# Patient Record
Sex: FEMALE | Race: WHITE
[De-identification: names, ages, dates, MRNs, and addresses within clinical notes are randomized per-mention and may not be internally consistent; named-entity substitution may affect disease eponyms.]

---

## 2019-04-07 ENCOUNTER — HOSPITAL ENCOUNTER (OUTPATIENT)
Dept: HOSPITAL 31 - C.ER | Age: 44
Setting detail: OBSERVATION
LOS: 1 days | Discharge: LEFT BEFORE DISCHARGE | End: 2019-04-08
Attending: INTERNAL MEDICINE | Admitting: INTERNAL MEDICINE
Payer: MEDICAID

## 2019-04-07 DIAGNOSIS — Z88.6: ICD-10-CM

## 2019-04-07 DIAGNOSIS — Z91.19: ICD-10-CM

## 2019-04-07 DIAGNOSIS — J45.909: ICD-10-CM

## 2019-04-07 DIAGNOSIS — F17.210: ICD-10-CM

## 2019-04-07 DIAGNOSIS — I10: ICD-10-CM

## 2019-04-07 DIAGNOSIS — E11.9: ICD-10-CM

## 2019-04-07 DIAGNOSIS — F10.239: ICD-10-CM

## 2019-04-07 DIAGNOSIS — Z91.14: ICD-10-CM

## 2019-04-07 DIAGNOSIS — E87.6: Primary | ICD-10-CM

## 2019-04-07 DIAGNOSIS — K12.1: ICD-10-CM

## 2019-04-07 DIAGNOSIS — G89.29: ICD-10-CM

## 2019-04-07 DIAGNOSIS — F32.9: ICD-10-CM

## 2019-04-07 DIAGNOSIS — D64.9: ICD-10-CM

## 2019-04-07 LAB
ALBUMIN SERPL-MCNC: 3.2 G/DL (ref 3.5–5)
ALBUMIN/GLOB SERPL: 0.8 {RATIO} (ref 1–2.1)
ALT SERPL-CCNC: 15 U/L (ref 9–52)
APTT BLD: 36 SECONDS (ref 21–34)
AST SERPL-CCNC: 59 U/L (ref 14–36)
BACTERIA #/AREA URNS HPF: (no result) /[HPF]
BASOPHILS # BLD AUTO: 0.1 K/UL (ref 0–0.2)
BASOPHILS NFR BLD: 2.1 % (ref 0–2)
BILIRUB UR-MCNC: NEGATIVE MG/DL
BUN SERPL-MCNC: 5 MG/DL (ref 7–17)
CALCIUM SERPL-MCNC: 8.4 MG/DL (ref 8.6–10.4)
EOSINOPHIL # BLD AUTO: 0.1 K/UL (ref 0–0.7)
EOSINOPHIL NFR BLD: 1.3 % (ref 0–4)
ERYTHROCYTE [DISTWIDTH] IN BLOOD BY AUTOMATED COUNT: 19.6 % (ref 11.5–14.5)
GFR NON-AFRICAN AMERICAN: > 60
GLUCOSE UR STRIP-MCNC: NORMAL MG/DL
HCG,QUALITATIVE URINE: NEGATIVE
HGB BLD-MCNC: 8.7 G/DL (ref 11–16)
INR PPP: 1
LEUKOCYTE ESTERASE UR-ACNC: (no result) LEU/UL
LYMPHOCYTES # BLD AUTO: 3.1 K/UL (ref 1–4.3)
LYMPHOCYTES NFR BLD AUTO: 56.2 % (ref 20–40)
MCH RBC QN AUTO: 32.5 PG (ref 27–31)
MCHC RBC AUTO-ENTMCNC: 34.2 G/DL (ref 33–37)
MCV RBC AUTO: 95.1 FL (ref 81–99)
MONOCYTES # BLD: 0.4 K/UL (ref 0–0.8)
MONOCYTES NFR BLD: 7.9 % (ref 0–10)
NEUTROPHILS # BLD: 1.8 K/UL (ref 1.8–7)
NEUTROPHILS NFR BLD AUTO: 32.5 % (ref 50–75)
NRBC BLD AUTO-RTO: 0.2 % (ref 0–2)
PH UR STRIP: 7 [PH] (ref 5–8)
PLATELET # BLD: 514 K/UL (ref 130–400)
PMV BLD AUTO: 7.3 FL (ref 7.2–11.7)
PROT UR STRIP-MCNC: NEGATIVE MG/DL
PROTHROMBIN TIME: 11.3 SECONDS (ref 9.7–12.2)
RBC # BLD AUTO: 2.68 MIL/UL (ref 3.8–5.2)
RBC # UR STRIP: NEGATIVE /UL
SP GR UR STRIP: 1.01 (ref 1–1.03)
SQUAMOUS EPITHIAL: 10 /HPF (ref 0–5)
UROBILINOGEN UR-MCNC: NORMAL MG/DL (ref 0.2–1)
WBC # BLD AUTO: 5.5 K/UL (ref 4.8–10.8)

## 2019-04-07 PROCEDURE — 70481 CT ORBIT/EAR/FOSSA W/DYE: CPT

## 2019-04-07 PROCEDURE — 83550 IRON BINDING TEST: CPT

## 2019-04-07 PROCEDURE — 97116 GAIT TRAINING THERAPY: CPT

## 2019-04-07 PROCEDURE — 80349 CANNABINOIDS NATURAL: CPT

## 2019-04-07 PROCEDURE — 96374 THER/PROPH/DIAG INJ IV PUSH: CPT

## 2019-04-07 PROCEDURE — 71045 X-RAY EXAM CHEST 1 VIEW: CPT

## 2019-04-07 PROCEDURE — 80053 COMPREHEN METABOLIC PANEL: CPT

## 2019-04-07 PROCEDURE — 80361 OPIATES 1 OR MORE: CPT

## 2019-04-07 PROCEDURE — 83735 ASSAY OF MAGNESIUM: CPT

## 2019-04-07 PROCEDURE — 99285 EMERGENCY DEPT VISIT HI MDM: CPT

## 2019-04-07 PROCEDURE — 86900 BLOOD TYPING SEROLOGIC ABO: CPT

## 2019-04-07 PROCEDURE — 96375 TX/PRO/DX INJ NEW DRUG ADDON: CPT

## 2019-04-07 PROCEDURE — 80074 ACUTE HEPATITIS PANEL: CPT

## 2019-04-07 PROCEDURE — 84703 CHORIONIC GONADOTROPIN ASSAY: CPT

## 2019-04-07 PROCEDURE — 81001 URINALYSIS AUTO W/SCOPE: CPT

## 2019-04-07 PROCEDURE — 80353 DRUG SCREENING COCAINE: CPT

## 2019-04-07 PROCEDURE — 80358 DRUG SCREENING METHADONE: CPT

## 2019-04-07 PROCEDURE — 80320 DRUG SCREEN QUANTALCOHOLS: CPT

## 2019-04-07 PROCEDURE — 82607 VITAMIN B-12: CPT

## 2019-04-07 PROCEDURE — 80345 DRUG SCREENING BARBITURATES: CPT

## 2019-04-07 PROCEDURE — 83036 HEMOGLOBIN GLYCOSYLATED A1C: CPT

## 2019-04-07 PROCEDURE — 93005 ELECTROCARDIOGRAM TRACING: CPT

## 2019-04-07 PROCEDURE — 82948 REAGENT STRIP/BLOOD GLUCOSE: CPT

## 2019-04-07 PROCEDURE — 83992 ASSAY FOR PHENCYCLIDINE: CPT

## 2019-04-07 PROCEDURE — 96365 THER/PROPH/DIAG IV INF INIT: CPT

## 2019-04-07 PROCEDURE — 74177 CT ABD & PELVIS W/CONTRAST: CPT

## 2019-04-07 PROCEDURE — 80324 DRUG SCREEN AMPHETAMINES 1/2: CPT

## 2019-04-07 PROCEDURE — 80346 BENZODIAZEPINES1-12: CPT

## 2019-04-07 PROCEDURE — 86850 RBC ANTIBODY SCREEN: CPT

## 2019-04-07 PROCEDURE — 82746 ASSAY OF FOLIC ACID SERUM: CPT

## 2019-04-07 PROCEDURE — 85730 THROMBOPLASTIN TIME PARTIAL: CPT

## 2019-04-07 PROCEDURE — 85610 PROTHROMBIN TIME: CPT

## 2019-04-07 PROCEDURE — 96366 THER/PROPH/DIAG IV INF ADDON: CPT

## 2019-04-07 PROCEDURE — 85025 COMPLETE CBC W/AUTO DIFF WBC: CPT

## 2019-04-07 PROCEDURE — 83540 ASSAY OF IRON: CPT

## 2019-04-07 PROCEDURE — 36415 COLL VENOUS BLD VENIPUNCTURE: CPT

## 2019-04-07 PROCEDURE — 84100 ASSAY OF PHOSPHORUS: CPT

## 2019-04-07 PROCEDURE — 97162 PT EVAL MOD COMPLEX 30 MIN: CPT
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
## 2019-04-08 VITALS — OXYGEN SATURATION: 100 % | TEMPERATURE: 99 F

## 2019-04-08 VITALS — HEART RATE: 99 BPM

## 2019-04-08 VITALS — DIASTOLIC BLOOD PRESSURE: 89 MMHG | SYSTOLIC BLOOD PRESSURE: 141 MMHG

## 2019-04-08 VITALS — RESPIRATION RATE: 20 BRPM

## 2019-04-08 LAB
% IRON SATURATION: 93 (ref 20–55)
ALBUMIN SERPL-MCNC: 3 G/DL (ref 3.5–5)
ALBUMIN/GLOB SERPL: 0.8 {RATIO} (ref 1–2.1)
ALT SERPL-CCNC: 17 U/L (ref 9–52)
AST SERPL-CCNC: 79 U/L (ref 14–36)
BASOPHILS # BLD AUTO: 0 K/UL (ref 0–0.2)
BASOPHILS NFR BLD: 0.2 % (ref 0–2)
BUN SERPL-MCNC: 4 MG/DL (ref 7–17)
CALCIUM SERPL-MCNC: 7.8 MG/DL (ref 8.6–10.4)
EOSINOPHIL # BLD AUTO: 0.1 K/UL (ref 0–0.7)
EOSINOPHIL NFR BLD: 1.3 % (ref 0–4)
ERYTHROCYTE [DISTWIDTH] IN BLOOD BY AUTOMATED COUNT: 19.7 % (ref 11.5–14.5)
FOLATE SERPL-MCNC: > 20 NG/ML
GFR NON-AFRICAN AMERICAN: > 60
HEPATITIS A IGM: NEGATIVE
HEPATITIS B CORE AB: NEGATIVE
HEPATITIS C ANTIBODY: NEGATIVE
HGB BLD-MCNC: 8.7 G/DL (ref 11–16)
IRON SERPL-MCNC: 182 UG/DL (ref 37–170)
LYMPHOCYTES # BLD AUTO: 3.6 K/UL (ref 1–4.3)
LYMPHOCYTES NFR BLD AUTO: 43 % (ref 20–40)
MCH RBC QN AUTO: 32.4 PG (ref 27–31)
MCHC RBC AUTO-ENTMCNC: 33.5 G/DL (ref 33–37)
MCV RBC AUTO: 96.8 FL (ref 81–99)
MONOCYTES # BLD: 0.9 K/UL (ref 0–0.8)
MONOCYTES NFR BLD: 10.4 % (ref 0–10)
NEUTROPHILS # BLD: 3.8 K/UL (ref 1.8–7)
NEUTROPHILS NFR BLD AUTO: 45.1 % (ref 50–75)
NRBC BLD AUTO-RTO: 0.1 % (ref 0–2)
PLATELET # BLD: 517 K/UL (ref 130–400)
PMV BLD AUTO: 8.1 FL (ref 7.2–11.7)
RBC # BLD AUTO: 2.69 MIL/UL (ref 3.8–5.2)
TIBC SERPL-MCNC: 195 UG/DL (ref 250–450)
VIT B12 SERPL-MCNC: 805 PG/ML (ref 239–931)
WBC # BLD AUTO: 8.3 K/UL (ref 4.8–10.8)

## 2019-04-08 RX ADMIN — MAGNESIUM SULFATE IN DEXTROSE SCH MLS/HR: 10 INJECTION, SOLUTION INTRAVENOUS at 10:55

## 2019-04-08 RX ADMIN — INSULIN ASPART SCH: 100 INJECTION, SOLUTION INTRAVENOUS; SUBCUTANEOUS at 20:12

## 2019-04-08 RX ADMIN — MAGNESIUM SULFATE IN DEXTROSE SCH MLS/HR: 10 INJECTION, SOLUTION INTRAVENOUS at 10:53

## 2019-04-08 RX ADMIN — INSULIN ASPART SCH: 100 INJECTION, SOLUTION INTRAVENOUS; SUBCUTANEOUS at 23:09

## 2019-04-08 RX ADMIN — INSULIN ASPART SCH: 100 INJECTION, SOLUTION INTRAVENOUS; SUBCUTANEOUS at 12:08

## 2019-04-08 NOTE — PCM.PSYCH
Initial Psychiatric Evaluation





- Initial Psychiatric Evaluation


Type of Admission: Voluntary


Legal Status: Capacity


History of Present Illness and Precipitating Events: 





Ms. Patterson is a 43yo  female with a PMH uncontrolled DM, liver

failure, and pancreatitis here today for generalized weakness and pain. [facility] 

psychiatry was consulted.





Patient reports of drinking up to 3-5 12-16 oz beers. She reports that she lives

alone and she drinks daily.  She denies any withdrawal symptoms from drinking. 

She reports anxiety and irritability but denies any depressed mood or any 

feelings of hopelessness or helplessness.  She denies any suicidal ideation or 

any homicidal ideation.  She denies any auditory hallucinations or any paranoia.





Current Medications: 





Active Medications











Generic Name Dose Route Start Last Admin





  Trade Name Freq  PRN Reason Stop Dose Admin


 


Chlordiazepoxide  25 mg  04/08/19 09:30  04/08/19 09:45





  Librium  PO  04/12/19 09:29  25 mg





  Q6 SILVIA   Administration





     





  Taper   





     





     


 


Dextrose  0 ml  04/08/19 03:07  





  Dextrose 50% Inj  IV   





  STAT PRN   





  Hypoglycemia Protocol   





     





  Protocol   





     


 


Dextrose  0 gm  04/08/19 03:07  





  Glutose 15  PO   





  ONCE PRN   





  Hypoglycemia Protocol   





     





  Protocol   





     


 


Enalapril Maleate  10 mg  04/08/19 13:00  





  Vasotec  PO   





  BID SILVIA   





     





     





     





     


 


Folic Acid  1 mg  04/08/19 10:00  04/08/19 09:13





  Folic Acid  PO   1 mg





  DAILY SILVIA   Administration





     





     





     





     


 


Glucagon  0 mg  04/08/19 03:07  





  Glucagen Diagnostic Kit  IM   





  STAT PRN   





  Hypoglycemia Protocol   





     





  Protocol   





     


 


Dextrose  1,000 mls @ 0 mls/hr  04/08/19 03:07  





  Dextrose 5% In Water 1000 Ml  IV   





  .Q0M PRN   





  Hypoglycemia Protocol   





     





  Protocol   





  Per Protocol   


 


Insulin Aspart  0 unit  04/08/19 07:32  04/08/19 12:08





  Novolog  SC   Not Given





  ACHS SILVIA   





     





     





  Protocol   





     


 


Lorazepam  1 mg  04/08/19 02:37  04/08/19 04:33





  Ativan  IVP   1 mg





  Q6H PRN   Administration





  Symptoms of alcohol withdrawl   





     





     





     


 


Multivitamins  1 tab  04/08/19 10:00  04/08/19 09:13





  Hexavitamin  PO   1 tab





  DAILY SILVIA   Administration





     





     





     





     


 


Nicotine  1 patch  04/08/19 10:00  04/08/19 09:13





  Nicoderm Cq  TD   1 patch





  DAILY SILVIA   Administration





     





     





     





     


 


Pneumococcal Polyvalent Vaccine  0.5 ml  04/11/19 10:00  





  Pneumovax 23 Vaccine  IM  04/11/19 10:01  





  .ONCE ONE   





     





     





     





     


 


Thiamine HCl  100 mg  04/08/19 10:00  04/08/19 09:13





  Vitamin B1 Tab  PO   100 mg





  DAILY SILVIA   Administration





     





     





     





     














Past Psychiatric History





- Past Psychiatric History


Previous Treatment History: None


Pertinent Medical Hx (Current Medical&Sleep Prob, Allergies): 





                                    Allergies











Allergy/AdvReac Type Severity Reaction Status Date / Time


 


aspirin Allergy  VOMITING Verified 04/07/19 20:25








                                        





Enalapril Maleate [Vasotec] 10 mg PO DAILY 04/08/19 


Ferrous Sulfate [Feosol] 325 mg PO DAILY 04/08/19 


Fluticasone Propionate [Flonase Allergy Relief] 1 spray NS DAILY PRN 04/08/19 


Folic Acid 1 mg PO DAILY 04/08/19 


Multivitamin [Multi-Vitamin Daily] 1 each PO DAILY 04/08/19 


hydroCHLOROthiazide [Hydrodiuril] 25 mg PO DAILY 04/08/19 











Review of Systems





- Review of Systems


All systems: reviewed and no additional remarkable complaints except





- Psychiatric


Psychiatric: Anxiety, Irritability.  absent: Suicidal Ideation





Mental Status Examination





- Personal Presentation


Personal Presentation: Looks stated age





- Affect


Affect: Constricted





- Motor Activity


Motor Activity: Calm





- Reliability in Providing Information


Reliability in Providing Information: Fair





- Speech


Speech: Organized





- Mood


Mood: Anxious





- Formal Thought Process


Formal Thought Process: No Impairment





- Obsessions/Compulsions


Obsessions: No


Compulsions: No





- Cognitive Functions


Orientation: Person, Place, Situation, Time


Sensorium: Alert


Attention/Concentration: Attentive


Abstract Thinking: [place]


Estimate of Intelligence: Below average


Judgement: Imparied, as evidence by: Poor judgement, Imparied, as evidence by: 

Lack of insight into illness





- Risk


Risk: Diminished functioning





- Limitations


Limitations: Living alone





DSM 5 DX





- DSM 5


DSM 5 Diagnosis: 





Alcohol use disorder moderate


depressive disorder








- Recommended/Plan of Treatment


Treatment Recommendations and Plan of Treatment: 








Alcohol use disorder moderate


depressive disorder





Pt psychiatrically cleared

## 2019-04-08 NOTE — CT
Date of service: 



04/08/2019



PROCEDURE:  CT Abdomen and Pelvis with contrast



HISTORY:

ABD DISTENSION, POOR INTAKE



COMPARISON:

None.



TECHNIQUE:

Intravenous contrast dose: 60 cc Visipaque 320.



Radiation dose:



Total exam DLP = 193.97 mGy-cm.



This CT exam was performed using one or more of the following dose 

reduction techniques: Automated exposure control, adjustment of the 

mA and/or kV according to patient size, and/or use of iterative 

reconstruction technique.



FINDINGS:



LOWER THORAX:

Incompletely visualized atelectasis, honeycombing at the lung bases.  

Bronchitic changes suspected. 



LIVER:

Unremarkable. No gross lesion or ductal dilatation. 



GALLBLADDER AND BILE DUCTS:

Unremarkable. 



PANCREAS:

Unremarkable. No gross lesion or ductal dilatation.



SPLEEN:

Unremarkable. 



ADRENALS:

Unremarkable. No mass. 



KIDNEYS AND URETERS:

Right kidney: Cortical scarring upper pole posteriorly likely the 

sequela of prior pyelonephritis.  Unremarkable.  No hydronephrosis. 

No solid mass. 



VASCULATURE:

Unremarkable. No aortic aneurysm. Atherosclerotic calcification and 

mural plaque present.  Findings are seen throughout the aorta which 

is non aneurysmal. 



BOWEL:

Fecal impaction, constipation without mechanical obstruction. 



APPENDIX:

A normal appendix is visualized in it's entirety. 



PERITONEUM:

Unremarkable. No free fluid. No free air. 



LYMPH NODES:

Unremarkable. No enlarged lymph nodes. 



BLADDER:

Unremarkable. 



REPRODUCTIVE:

Unremarkable. 



BONES:

No acute fracture. 



OTHER FINDINGS:

None.



IMPRESSION:

No significant or acute  findings to account for/ related to the 

clinical presentation.



Additional benign and/or incidental findings described above.

## 2019-04-08 NOTE — RAD
Date of service: 



04/07/2019



HISTORY:

 SOB 



COMPARISON:

None available.



TECHNIQUE:

1 view obtained.



FINDINGS:



LUNGS:

No active pulmonary disease.



PLEURA:

No significant pleural effusion identified, no pneumothorax apparent.



CARDIOVASCULAR:

No aortic atherosclerotic calcification present.



 Normal cardiac size. No pulmonary vascular congestion. 



OSSEOUS STRUCTURES:

No significant abnormalities.



VISUALIZED UPPER ABDOMEN:

Normal.



OTHER FINDINGS:

None.



IMPRESSION:

No acute cardiopulmonary disease appreciated.
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
## 2019-04-08 NOTE — CARD
--------------- APPROVED REPORT --------------





Date of service: 04/07/2019



EKG Measurement

Heart [id number]

MI [id number]

[id number]

[id number]

[id number]



<Conclusion>

Normal sinus rhythm

Possible Left atrial enlargement

Cannot rule out Anteroseptal infarct, age undetermined

Abnormal ECG
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
## 2019-04-09 NOTE — CT
Date of service: 



04/08/2019



PROCEDURE:  CT ORBITS/ FACIAL WITH CONTRAST.



HISTORY:

FACIAL SWELLING, EYE SWELLING CORNEAL ULCER



COMPARISON:

None available.



TECHNIQUE:

Following administration of intravenous iodinated contrast, axial CT 

images of the orbits were obtained. Coronal and sagittal reformats 

were generated.



Intravenous contrast dose: 40 mL of Visipaque 320



Radiation dose:



Total exam DLP = 695.42 mGy-cm.



This CT exam was performed using one or more of the following dose 

reduction techniques: Automated exposure control, adjustment of the 

mA and/or kV according to patient size, and/or use of iterative 

reconstruction technique.



FINDINGS:



RIGHT ORBIT:



RIGHT BONY ORBIT:

Normal.



RIGHT INTRAORBITAL STRUCTURES:

Globe: Normal.



Extraocular muscles: Normal.



Post septal space: Normal.



Optic Nerve: Normal.



Lacrimal Apparatus: Normal.



RIGHT PRESEPTAL SOFT TISSUES:

Possible minimal thickening 



LEFT ORBIT:



LEFT BONY ORBIT:

Normal.



LEFT INTRAORBITAL STRUCTURES:

Globe: Normal.



Extraocular muscles: Normal.



Post septal space: Normal.



Optic Nerve: Normal.



Lacrimal Apparatus: Normal.



LEFT PRESEPTAL SOFT TISSUES:

Possible minimal thickening-left slightly greater on these images 

than right.  Correlate clinically



OTHER:

Nonspecific benign-appearing bilateral cervical lymph nodes 



No CT appreciated criteria for lymphadenopathy seen. 



Dental amalgam artifact noted 



IMPRESSION:

Perceived slight thickening of preseptal soft tissues bilateral-left 

possibly greater than right.  Correlate clinically.  No focal fluid 

collection seen.  Based on history-consider ophthalmology 

consultation/follow-up 



Other findings as above. 



Apart from the aforementioned slight thickening of the preseptal soft 

tissues bilaterally, this report is concordant with the preliminary 

[place] rad report.
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
